# Patient Record
Sex: FEMALE | Race: WHITE
[De-identification: names, ages, dates, MRNs, and addresses within clinical notes are randomized per-mention and may not be internally consistent; named-entity substitution may affect disease eponyms.]

---

## 2018-02-07 ENCOUNTER — HOSPITAL ENCOUNTER (OUTPATIENT)
Dept: HOSPITAL 84 - D.CT | Age: 58
Discharge: HOME | End: 2018-02-07
Attending: FAMILY MEDICINE
Payer: MEDICAID

## 2018-02-07 DIAGNOSIS — R22.1: Primary | ICD-10-CM

## 2018-03-06 ENCOUNTER — HOSPITAL ENCOUNTER (OUTPATIENT)
Dept: HOSPITAL 84 - D.NM | Age: 58
Discharge: HOME | End: 2018-03-06
Attending: FAMILY MEDICINE
Payer: MEDICAID

## 2018-03-06 DIAGNOSIS — E04.9: Primary | ICD-10-CM

## 2018-07-03 ENCOUNTER — HOSPITAL ENCOUNTER (OUTPATIENT)
Dept: HOSPITAL 84 - D.MAMMO | Age: 58
Discharge: HOME | End: 2018-07-03
Attending: FAMILY MEDICINE
Payer: MEDICAID

## 2018-07-03 DIAGNOSIS — Z12.31: Primary | ICD-10-CM

## 2018-09-14 ENCOUNTER — HOSPITAL ENCOUNTER (OUTPATIENT)
Dept: HOSPITAL 84 - D.US | Age: 58
Discharge: HOME | End: 2018-09-14
Attending: FAMILY MEDICINE
Payer: MEDICAID

## 2018-09-14 DIAGNOSIS — I73.9: Primary | ICD-10-CM

## 2018-10-12 ENCOUNTER — HOSPITAL ENCOUNTER (OUTPATIENT)
Dept: HOSPITAL 84 - D.CATH | Age: 58
Discharge: HOME | End: 2018-10-12
Attending: INTERNAL MEDICINE
Payer: MEDICAID

## 2018-10-12 VITALS — DIASTOLIC BLOOD PRESSURE: 66 MMHG | SYSTOLIC BLOOD PRESSURE: 150 MMHG

## 2018-10-12 VITALS — HEIGHT: 67 IN | BODY MASS INDEX: 29.1 KG/M2 | WEIGHT: 185.39 LBS

## 2018-10-12 DIAGNOSIS — R07.9: Primary | ICD-10-CM

## 2018-10-12 DIAGNOSIS — I70.211: ICD-10-CM

## 2018-10-12 LAB
ANION GAP SERPL CALC-SCNC: 12.5 MMOL/L (ref 8–16)
BASOPHILS NFR BLD AUTO: 0.3 % (ref 0–2)
BUN SERPL-MCNC: 15 MG/DL (ref 7–18)
CALCIUM SERPL-MCNC: 9 MG/DL (ref 8.5–10.1)
CHLORIDE SERPL-SCNC: 104 MMOL/L (ref 98–107)
CO2 SERPL-SCNC: 27.2 MMOL/L (ref 21–32)
CREAT SERPL-MCNC: 0.5 MG/DL (ref 0.6–1.3)
EOSINOPHIL NFR BLD: 1.3 % (ref 0–7)
ERYTHROCYTE [DISTWIDTH] IN BLOOD BY AUTOMATED COUNT: 12.7 % (ref 11.5–14.5)
GLUCOSE SERPL-MCNC: 118 MG/DL (ref 74–106)
HCT VFR BLD CALC: 42.9 % (ref 36–48)
HGB BLD-MCNC: 14.7 G/DL (ref 12–16)
IMM GRANULOCYTES NFR BLD: 0.3 % (ref 0–5)
LYMPHOCYTES NFR BLD AUTO: 26.8 % (ref 15–50)
MCH RBC QN AUTO: 30.4 PG (ref 26–34)
MCHC RBC AUTO-ENTMCNC: 34.3 G/DL (ref 31–37)
MCV RBC: 88.6 FL (ref 80–100)
MONOCYTES NFR BLD: 12.8 % (ref 2–11)
NEUTROPHILS NFR BLD AUTO: 58.5 % (ref 40–80)
OSMOLALITY SERPL CALC.SUM OF ELEC: 280 MOSM/KG (ref 275–300)
PLATELET # BLD: 354 10X3/UL (ref 130–400)
PMV BLD AUTO: 9.3 FL (ref 7.4–10.4)
POTASSIUM SERPL-SCNC: 3.7 MMOL/L (ref 3.5–5.1)
RBC # BLD AUTO: 4.84 10X6/UL (ref 4–5.4)
SODIUM SERPL-SCNC: 140 MMOL/L (ref 136–145)
WBC # BLD AUTO: 9.7 10X3/UL (ref 4.8–10.8)

## 2019-02-11 ENCOUNTER — HOSPITAL ENCOUNTER (OUTPATIENT)
Dept: HOSPITAL 84 - D.CT | Age: 59
Discharge: HOME | End: 2019-02-11
Attending: THORACIC SURGERY (CARDIOTHORACIC VASCULAR SURGERY)
Payer: MEDICAID

## 2019-02-11 ENCOUNTER — HOSPITAL ENCOUNTER (INPATIENT)
Dept: HOSPITAL 84 - D.SDCHOLD | Age: 59
LOS: 8 days | Discharge: HOME | DRG: 254 | End: 2019-02-19
Attending: THORACIC SURGERY (CARDIOTHORACIC VASCULAR SURGERY) | Admitting: THORACIC SURGERY (CARDIOTHORACIC VASCULAR SURGERY)
Payer: MEDICAID

## 2019-02-11 VITALS — BODY MASS INDEX: 28 KG/M2 | WEIGHT: 178.37 LBS | HEIGHT: 67 IN | BODY MASS INDEX: 28 KG/M2

## 2019-02-11 VITALS — BODY MASS INDEX: 29 KG/M2

## 2019-02-11 DIAGNOSIS — I71.4: Primary | ICD-10-CM

## 2019-02-11 DIAGNOSIS — I70.211: Primary | ICD-10-CM

## 2019-02-11 LAB
ALBUMIN SERPL-MCNC: 4.1 G/DL (ref 3.4–5)
ALP SERPL-CCNC: 85 U/L (ref 46–116)
ALT SERPL-CCNC: 28 U/L (ref 10–68)
ANION GAP SERPL CALC-SCNC: 12.5 MMOL/L (ref 8–16)
APPEARANCE UR: (no result)
APTT BLD: 28 SECONDS (ref 22.8–39.4)
BASOPHILS NFR BLD AUTO: 0.2 % (ref 0–2)
BILIRUB SERPL-MCNC: 0.34 MG/DL (ref 0.2–1.3)
BILIRUB SERPL-MCNC: NEGATIVE MG/DL
BUN SERPL-MCNC: 15 MG/DL (ref 7–18)
CALCIUM SERPL-MCNC: 9 MG/DL (ref 8.5–10.1)
CHLORIDE SERPL-SCNC: 102 MMOL/L (ref 98–107)
CO2 SERPL-SCNC: 28.2 MMOL/L (ref 21–32)
COLOR UR: YELLOW
CREAT SERPL-MCNC: 0.6 MG/DL (ref 0.6–1.3)
EOSINOPHIL NFR BLD: 0.8 % (ref 0–7)
ERYTHROCYTE [DISTWIDTH] IN BLOOD BY AUTOMATED COUNT: 12.9 % (ref 11.5–14.5)
GLOBULIN SER-MCNC: 3.8 G/L
GLUCOSE SERPL-MCNC: 108 MG/DL (ref 74–106)
GLUCOSE SERPL-MCNC: NEGATIVE MG/DL
HCT VFR BLD CALC: 43.7 % (ref 36–48)
HGB BLD-MCNC: 14.9 G/DL (ref 12–16)
IMM GRANULOCYTES NFR BLD: 0.3 % (ref 0–5)
INR PPP: 0.99 (ref 0.85–1.17)
KETONES UR STRIP-MCNC: NEGATIVE MG/DL
LYMPHOCYTES NFR BLD AUTO: 26.5 % (ref 15–50)
MCH RBC QN AUTO: 30.6 PG (ref 26–34)
MCHC RBC AUTO-ENTMCNC: 34.1 G/DL (ref 31–37)
MCV RBC: 89.7 FL (ref 80–100)
MONOCYTES NFR BLD: 8.3 % (ref 2–11)
NEUTROPHILS NFR BLD AUTO: 63.9 % (ref 40–80)
NITRITE UR-MCNC: NEGATIVE MG/ML
OSMOLALITY SERPL CALC.SUM OF ELEC: 279 MOSM/KG (ref 275–300)
PA ADP PRP-ACNC: 106 PRU (ref 194–418)
PH UR STRIP: 5 [PH] (ref 5–6)
PLATELET # BLD: 471 10X3/UL (ref 130–400)
PMV BLD AUTO: 9.7 FL (ref 7.4–10.4)
POTASSIUM SERPL-SCNC: 3.7 MMOL/L (ref 3.5–5.1)
PROT SERPL-MCNC: 7.9 G/DL (ref 6.4–8.2)
PROT UR-MCNC: NEGATIVE MG/DL
PROTHROMBIN TIME: 12.6 SECONDS (ref 11.6–15)
RBC # BLD AUTO: 4.87 10X6/UL (ref 4–5.4)
SODIUM SERPL-SCNC: 139 MMOL/L (ref 136–145)
SP GR UR STRIP: 1.02 (ref 1–1.02)
UROBILINOGEN UR-MCNC: 1 MG/DL
WBC # BLD AUTO: 15.5 10X3/UL (ref 4.8–10.8)

## 2019-02-18 VITALS — SYSTOLIC BLOOD PRESSURE: 118 MMHG | DIASTOLIC BLOOD PRESSURE: 51 MMHG

## 2019-02-18 VITALS — DIASTOLIC BLOOD PRESSURE: 61 MMHG | SYSTOLIC BLOOD PRESSURE: 111 MMHG

## 2019-02-18 VITALS — SYSTOLIC BLOOD PRESSURE: 116 MMHG | DIASTOLIC BLOOD PRESSURE: 51 MMHG

## 2019-02-18 VITALS — SYSTOLIC BLOOD PRESSURE: 115 MMHG | DIASTOLIC BLOOD PRESSURE: 48 MMHG

## 2019-02-18 VITALS — DIASTOLIC BLOOD PRESSURE: 49 MMHG | SYSTOLIC BLOOD PRESSURE: 116 MMHG

## 2019-02-18 VITALS — SYSTOLIC BLOOD PRESSURE: 114 MMHG | DIASTOLIC BLOOD PRESSURE: 48 MMHG

## 2019-02-18 VITALS — DIASTOLIC BLOOD PRESSURE: 60 MMHG | SYSTOLIC BLOOD PRESSURE: 128 MMHG

## 2019-02-18 VITALS — SYSTOLIC BLOOD PRESSURE: 125 MMHG | DIASTOLIC BLOOD PRESSURE: 55 MMHG

## 2019-02-18 VITALS — SYSTOLIC BLOOD PRESSURE: 128 MMHG | DIASTOLIC BLOOD PRESSURE: 55 MMHG

## 2019-02-18 VITALS — DIASTOLIC BLOOD PRESSURE: 55 MMHG | SYSTOLIC BLOOD PRESSURE: 125 MMHG

## 2019-02-18 VITALS — DIASTOLIC BLOOD PRESSURE: 57 MMHG | SYSTOLIC BLOOD PRESSURE: 127 MMHG

## 2019-02-18 LAB
HCT VFR BLD CALC: 39.2 % (ref 36–48)
HGB BLD-MCNC: 12.9 G/DL (ref 12–16)

## 2019-02-18 PROCEDURE — 041K0JL BYPASS RIGHT FEMORAL ARTERY TO POPLITEAL ARTERY WITH SYNTHETIC SUBSTITUTE, OPEN APPROACH: ICD-10-PCS | Performed by: THORACIC SURGERY (CARDIOTHORACIC VASCULAR SURGERY)

## 2019-02-19 VITALS — SYSTOLIC BLOOD PRESSURE: 125 MMHG | DIASTOLIC BLOOD PRESSURE: 52 MMHG

## 2019-02-19 VITALS — DIASTOLIC BLOOD PRESSURE: 51 MMHG | SYSTOLIC BLOOD PRESSURE: 125 MMHG

## 2019-02-19 VITALS — SYSTOLIC BLOOD PRESSURE: 112 MMHG | DIASTOLIC BLOOD PRESSURE: 45 MMHG

## 2019-02-19 VITALS — SYSTOLIC BLOOD PRESSURE: 121 MMHG | DIASTOLIC BLOOD PRESSURE: 49 MMHG

## 2019-02-19 VITALS — SYSTOLIC BLOOD PRESSURE: 113 MMHG | DIASTOLIC BLOOD PRESSURE: 46 MMHG

## 2019-02-19 VITALS — DIASTOLIC BLOOD PRESSURE: 45 MMHG | SYSTOLIC BLOOD PRESSURE: 109 MMHG

## 2019-02-19 VITALS — DIASTOLIC BLOOD PRESSURE: 52 MMHG | SYSTOLIC BLOOD PRESSURE: 104 MMHG

## 2019-02-19 VITALS — DIASTOLIC BLOOD PRESSURE: 48 MMHG | SYSTOLIC BLOOD PRESSURE: 119 MMHG

## 2019-02-19 LAB
ALBUMIN SERPL-MCNC: 2.8 G/DL (ref 3.4–5)
ALP SERPL-CCNC: 67 U/L (ref 46–116)
ALT SERPL-CCNC: 21 U/L (ref 10–68)
ANION GAP SERPL CALC-SCNC: 11.8 MMOL/L (ref 8–16)
BILIRUB SERPL-MCNC: 0.33 MG/DL (ref 0.2–1.3)
BUN SERPL-MCNC: 11 MG/DL (ref 7–18)
CALCIUM SERPL-MCNC: 7.6 MG/DL (ref 8.5–10.1)
CHLORIDE SERPL-SCNC: 107 MMOL/L (ref 98–107)
CO2 SERPL-SCNC: 25.9 MMOL/L (ref 21–32)
CREAT SERPL-MCNC: 0.5 MG/DL (ref 0.6–1.3)
ERYTHROCYTE [DISTWIDTH] IN BLOOD BY AUTOMATED COUNT: 12.6 % (ref 11.5–14.5)
GLOBULIN SER-MCNC: 3.2 G/L
GLUCOSE SERPL-MCNC: 128 MG/DL (ref 74–106)
HCT VFR BLD CALC: 37.1 % (ref 36–48)
HGB BLD-MCNC: 12.2 G/DL (ref 12–16)
MCH RBC QN AUTO: 29.8 PG (ref 26–34)
MCHC RBC AUTO-ENTMCNC: 32.9 G/DL (ref 31–37)
MCV RBC: 90.5 FL (ref 80–100)
OSMOLALITY SERPL CALC.SUM OF ELEC: 281 MOSM/KG (ref 275–300)
PLATELET # BLD: 351 10X3/UL (ref 130–400)
PMV BLD AUTO: 9.7 FL (ref 7.4–10.4)
POTASSIUM SERPL-SCNC: 3.7 MMOL/L (ref 3.5–5.1)
PROT SERPL-MCNC: 6 G/DL (ref 6.4–8.2)
RBC # BLD AUTO: 4.1 10X6/UL (ref 4–5.4)
SODIUM SERPL-SCNC: 141 MMOL/L (ref 136–145)
WBC # BLD AUTO: 18.7 10X3/UL (ref 4.8–10.8)

## 2019-02-19 NOTE — MORECARE
CASE MANAGEMENT DISCHARGE SUMMARY
 
 
PATIENT: RYLEY KUMAR SHAYLA                   UNIT: [id number]
ACCOUNT#: [id number]                       ADM DATE: 19
AGE: 58     : 56  SEX: F            ROOM/BED: [facility]    
AUTHOR: VALERY CRENSHAW                             PHYSICIAN:                               
 
REFERRING PHYSICIAN: KASANDRA PENDLETON MD            
DATE OF SERVICE: 19
Discharge Plan
 
 
Patient Name: RYLEY KUMAR
[facility]
Encounter #: [id number]
Medical Record #: [id number]
: 1960
Planned Disposition: Home
Anticipated Discharge Date: 
 
Discharge Date: 2019
Expected LOS: 
Initial Reviewer: [id number]
Initial Review Date: 2019
Generated: 19   8:13 pm 
  
 
 
 
 
 
 
 
Patient Name: RYLEY KUMAR
 
Encounter #: [id number]
Page [id number]
 
 
 
 
 
Electronically Signed by VALERY CRENSHAW on 19 at 1913
 
 
 
 
 
 
**All edits/amendments must be made on the electronic document**
 
DICTATION DATE: 19     : MILAGROS  19     
RPT#: [id number]                                DC DATE:19
                                               STATUS: DIS IN  
[facility]
[address] [place] [address]
***END OF REPORT***

## 2019-02-19 NOTE — MORECARE
CASE MANAGEMENT DISCHARGE SUMMARY
 
 
PATIENT: RYLEY KUMAR SHAYLA                   UNIT: [id number]
ACCOUNT#: [id number]                       ADM DATE: 19
AGE: 58     : 56  SEX: F            ROOM/BED: [facility]    
AUTHOR: DEN,DOC                             PHYSICIAN:                               
 
REFERRING PHYSICIAN: KASANDRA PENDLETON MD            
DATE OF SERVICE: 19
Discharge Plan
 
 
Patient Name: RYLEY KUMAR
Facility: [facility]
Encounter #: [id number]
Medical Record #: [id number]
: 1960
Planned Disposition: Home
Anticipated Discharge Date: 
 
Discharge Date: 2019
Expected LOS: 
Initial Reviewer: [id number]
Initial Review Date: 2019
Generated: 19   8:20 pm 
Comments
 
DCP- Discharge Planning
 
Updated by [id number]: Maria Elena Yung on 19   6:17 pm CT
Patient Name: RYLEY KUMAR                                     
Admission Status: Elective   
Accout number: [id number]                              
Admission Date: 2019   
: 1960                                                        
Admission Diagnosis:   
Attending: KASANDRA PENDLETON                                                
Current LOS:  1   
  
Anticipated DC Date:    
Planned Disposition: Home   
Primary Insurance: [employer] [place] [employer] [facility]   
  
  
Discharge Planning Comments: CM met with patient and family at bedside.  
Patient states she lives at home with her  JUDY.  Patient plans to 
return to her home upon discharge.  Patient states that she has a walker and 
shower chair if needed.  She also denies any HH services prior to admission. 
Patient denies any discharge needs at this time. CM will continue to follow 
 
and assist as needed with discharge planning / needs.  
  
  
  
  
  
  
  
  
: Maria Elena Yung
 DCPIA - Discharge Planning Initial Assessment
 
Updated by [id number]: Maria Elena Yung on 19   7:18 pm
*  Is the patient Alert and Oriented?
Yes
*  How many steps to enter\exit or inside your home?
 
*  PCP
Dr. Sandeep Woodruff MD
*  Pharmacy
[facility]""
*  Preadmission Environment
Home with Family
*  ADLs
Independent
*  Equipment
None
*  Other Equipment
WALKER, SHOWER CHAIR
*  List name and contact numbers for known caregivers / representatives who 
currently or will assist patient after discharge:
JUDY KUMAR - [employer]- [phone]
*  Verbal permission to speak to the caregivers and representatives has been 
obtained from the patient.
Yes
*  Community resources currently utilized
None
*  Additional services required to return to the preadmission environment?
No
*  Can the patient safely return to the preadmission environment?
Yes
*  Has this patient been hospitalized within the prior 30 days at any 
hospital?
No
 
 
 
 
 
 
 
Last DP export: 19   6:13 p
 
Patient Name: RYLEY KUMAR
Encounter #: [id number]
Page [phone]
 
 
 
 
 
Electronically Signed by VALERY CRENSHAW on 19 at 1920
 
 
 
 
 
 
**All edits/amendments must be made on the electronic document**
 
DICTATION DATE: 19     : MILAGROS  19     
RPT#: [id number]                                DC DATE:19
                                               STATUS: DIS IN  
[facility]
 [place] [address]
***END OF REPORT***

## 2019-06-18 ENCOUNTER — HOSPITAL ENCOUNTER (OUTPATIENT)
Dept: HOSPITAL 84 - D.US | Age: 59
Discharge: HOME | End: 2019-06-18
Attending: INTERNAL MEDICINE
Payer: MEDICAID

## 2019-06-18 VITALS — BODY MASS INDEX: 27.9 KG/M2

## 2019-06-18 DIAGNOSIS — E04.1: Primary | ICD-10-CM

## 2019-06-24 ENCOUNTER — HOSPITAL ENCOUNTER (OUTPATIENT)
Dept: HOSPITAL 84 - D.US | Age: 59
Discharge: HOME | End: 2019-06-24
Attending: THORACIC SURGERY (CARDIOTHORACIC VASCULAR SURGERY)
Payer: MEDICAID

## 2019-06-24 VITALS — BODY MASS INDEX: 27.9 KG/M2

## 2019-06-24 DIAGNOSIS — I70.209: Primary | ICD-10-CM

## 2019-09-18 ENCOUNTER — HOSPITAL ENCOUNTER (OUTPATIENT)
Dept: HOSPITAL 84 - D.US | Age: 59
Discharge: HOME | End: 2019-09-18
Attending: THORACIC SURGERY (CARDIOTHORACIC VASCULAR SURGERY)
Payer: MEDICAID

## 2019-09-18 VITALS — BODY MASS INDEX: 27.9 KG/M2

## 2019-09-18 DIAGNOSIS — I70.209: Primary | ICD-10-CM
